# Patient Record
Sex: MALE | Race: WHITE
[De-identification: names, ages, dates, MRNs, and addresses within clinical notes are randomized per-mention and may not be internally consistent; named-entity substitution may affect disease eponyms.]

---

## 2022-06-03 PROBLEM — Z00.00 ENCOUNTER FOR PREVENTIVE HEALTH EXAMINATION: Status: ACTIVE | Noted: 2022-06-03

## 2022-06-07 ENCOUNTER — NON-APPOINTMENT (OUTPATIENT)
Age: 52
End: 2022-06-07

## 2022-06-09 PROBLEM — Z83.3 FAMILY HISTORY OF DIABETES MELLITUS: Status: ACTIVE | Noted: 2022-06-09

## 2022-06-09 PROBLEM — Z87.891 FORMER SMOKER: Status: ACTIVE | Noted: 2022-06-09

## 2022-06-10 ENCOUNTER — APPOINTMENT (OUTPATIENT)
Dept: VASCULAR SURGERY | Facility: CLINIC | Age: 52
End: 2022-06-10
Payer: MEDICAID

## 2022-06-10 DIAGNOSIS — R73.03 PREDIABETES.: ICD-10-CM

## 2022-06-10 DIAGNOSIS — L03.116 CELLULITIS OF LEFT LOWER LIMB: ICD-10-CM

## 2022-06-10 DIAGNOSIS — R60.0 LOCALIZED EDEMA: ICD-10-CM

## 2022-06-10 DIAGNOSIS — Z87.891 PERSONAL HISTORY OF NICOTINE DEPENDENCE: ICD-10-CM

## 2022-06-10 DIAGNOSIS — Z83.3 FAMILY HISTORY OF DIABETES MELLITUS: ICD-10-CM

## 2022-06-10 PROCEDURE — 99204 OFFICE O/P NEW MOD 45 MIN: CPT

## 2022-06-10 PROCEDURE — 93971 EXTREMITY STUDY: CPT

## 2022-06-23 NOTE — ADDENDUM
[FreeTextEntry1] : This note was written by Winter HOOPER, acting as a scribe for Dr. Moy Torrez.  I, Dr. Moy Torrez, have read and attest that all the information, medical decision-making, and discharge instructions within are true and accurate.\par \par I, Dr. Moy Torrez, personally performed the evaluation and management (E/M) services for this new patient.  That E/M includes conducting the initial examination, assessing all conditions, and establishing the plan of care.  Today, my ACP, Winter HOOPER, was here to observe my evaluation and management services for this patient to be followed going forward.\par \par \par

## 2022-06-23 NOTE — PHYSICAL EXAM
[Respiratory Effort] : normal respiratory effort [Normal Rate and Rhythm] : normal rate and rhythm [Alert] : alert [Calm] : calm [2+] : left 2+ [JVD] : no jugular venous distention  [Abdomen Tenderness] : ~T ~M No abdominal tenderness [de-identified] : Obese habitus, NAD [de-identified] : NC/AT [de-identified] : supple [de-identified] : obese, BS+ [de-identified] : +FROM 5/5x4 [de-identified] : LLE: edema from knee down and residual skin erythema from recent cellulitis, no skin breakdown [de-identified] : grossly intact

## 2022-06-23 NOTE — HISTORY OF PRESENT ILLNESS
[FreeTextEntry1] : 50 yo M, former smoker with PMHx of obesity and prediabetes, not on any medications presents for initial evaluation of left leg cellulitis and edema. He recently was placed on oral Keflex and later was hospitalized to a hospital  in [place] where he was treated with IV abx's. He states that skin erythema is resolving however he continues to experience pain and edema. He had US done while in the hospital and it was negative for DVT. He also mentions that he had xrays done and there is was questionable bone fracture, however when he was seen by an orthopedic specialist, he was told there is no fracture noted.  He ambulates without difficulty, denies skin breakdown, fever, chills.

## 2022-06-23 NOTE — ASSESSMENT
[Foot care/Footwear] : foot care/footwear [FreeTextEntry1] : 50 yo M, former smoker with PMHx of obesity and prediabetes, not on any medications presents for initial evaluation of left leg cellulitis and edema. He recently was placed on oral Keflex and later was hospitalized to a hospital  in [place] where he was treated with IV abx's. He states that skin erythema is resolving however he continues to experience pain and edema. He had US done while in the hospital and it was negative for DVT. He ambulates without difficulty, denies skin breakdown, fever, chills.\par Pt with left LE edema from knee down and residual skin erythema from recent cellulitis, no skin breakdown, palpable peripheral pulses.\par LLE venous doppler was done in the office that is negative for SVT/DVT\par Patient was explained that he doesn't have any vascular issues at this time and his leg edema is due to recent cellulitis and should resolve over time.\par He was recommended to moisturize his legs and to wear compression stockings on a regular basis, RX was provided.\par If no improvement in a month or two, he may need CTV of A/P to r/o pathology or f/u with the orthopedic doctor and maybe get MRI.

## 2022-06-23 NOTE — REVIEW OF SYSTEMS
[Negative] : Heme/Lymph [As noted in HPI] : as noted in HPI [Lower Ext Edema] : lower extremity edema [Fever] : no fever [Chills] : no chills [Leg Claudication] : no intermittent leg claudication

## 2022-08-24 ENCOUNTER — NON-APPOINTMENT (OUTPATIENT)
Age: 52
End: 2022-08-24